# Patient Record
Sex: FEMALE | Race: WHITE | NOT HISPANIC OR LATINO
[De-identification: names, ages, dates, MRNs, and addresses within clinical notes are randomized per-mention and may not be internally consistent; named-entity substitution may affect disease eponyms.]

---

## 2018-01-04 ENCOUNTER — APPOINTMENT (OUTPATIENT)
Dept: SPINE | Facility: CLINIC | Age: 58
End: 2018-01-04

## 2018-01-26 ENCOUNTER — TRANSCRIPTION ENCOUNTER (OUTPATIENT)
Age: 58
End: 2018-01-26

## 2022-09-29 ENCOUNTER — APPOINTMENT (OUTPATIENT)
Dept: ORTHOPEDIC SURGERY | Facility: CLINIC | Age: 62
End: 2022-09-29

## 2022-09-29 VITALS — BODY MASS INDEX: 25.9 KG/M2 | HEIGHT: 67 IN | WEIGHT: 165 LBS

## 2022-09-29 DIAGNOSIS — M25.461 EFFUSION, RIGHT KNEE: ICD-10-CM

## 2022-09-29 PROCEDURE — 99204 OFFICE O/P NEW MOD 45 MIN: CPT

## 2022-09-29 PROCEDURE — 73560 X-RAY EXAM OF KNEE 1 OR 2: CPT | Mod: RT

## 2022-09-29 PROCEDURE — 73565 X-RAY EXAM OF KNEES: CPT

## 2022-09-29 NOTE — HISTORY OF PRESENT ILLNESS
[Gradual] : gradual [5] : 5 [2] : 2 [Dull/Aching] : dull/aching [Intermittent] : intermittent [Rest] : rest [Ice] : ice [Walking] : walking [Stairs] : stairs [de-identified] : r knee pain and no locking or giving way . sometimes pain as walks and changes direction. some pain for 4 to 5 knees. no locking or giving was. sharp pain at times. No falls or injuries. chronic in nature and pain down  and stairs. 63 Y/o female\par \par MRI 2018 tear lateral meniscus and some degenerative changes . also synoviits due to age arthroscopy was not recc.\par \par noted chronic disc problem lumbar that has decided against surgery.. [] : no [FreeTextEntry5] : pt has trouble lifting the right leg up. pt feel pain in the right knee. pt feels weakness in the right  knee. the right  knee swells up.  [FreeTextEntry9] : Advil  [de-identified] : 2018 [de-identified] :

## 2022-09-29 NOTE — PHYSICAL EXAM
[Right] : right knee [Left] : left knee [NL (0)] : extension 0 degrees [5___] : hamstring 5[unfilled]/5 [] : negative Pivot Shift [TWNoteComboBox7] : flexion 130 degrees

## 2022-09-29 NOTE — IMAGING
[Right] : right knee [Lateral] : lateral [[place]] : skyline [Moderate patellofemoral OA] : Moderate patellofemoral OA [Moderate tricompartmental OA lateral narrowing] : Moderate tricompartmental OA lateral narrowing [Left] : left knee [AP Standing] : anteroposterior standing [There are no fractures, subluxations or dislocations. No significant abnormalities are seen] : There are no fractures, subluxations or dislocations. No significant abnormalities are seen

## 2022-10-17 RX ORDER — HYLAN G-F 20 16MG/2ML
16 SYRINGE (ML) INTRAARTICULAR WEEKLY
Qty: 1 | Refills: 0 | Status: ACTIVE | COMMUNITY
Start: 2022-10-17 | End: 1900-01-01

## 2022-10-26 ENCOUNTER — APPOINTMENT (OUTPATIENT)
Dept: ORTHOPEDIC SURGERY | Facility: CLINIC | Age: 62
End: 2022-10-26

## 2022-11-15 ENCOUNTER — APPOINTMENT (OUTPATIENT)
Dept: ORTHOPEDIC SURGERY | Facility: CLINIC | Age: 62
End: 2022-11-15
Payer: MEDICARE

## 2022-11-15 VITALS — BODY MASS INDEX: 25.9 KG/M2 | HEIGHT: 67 IN | WEIGHT: 165 LBS

## 2022-11-15 PROCEDURE — 99213 OFFICE O/P EST LOW 20 MIN: CPT | Mod: 25

## 2022-11-15 PROCEDURE — 20610 DRAIN/INJ JOINT/BURSA W/O US: CPT

## 2022-11-15 NOTE — PROCEDURE
[FreeTextEntry3] : Viscosupplementation Injection: X-ray evidence of osteoarthritis on this or prior visit and patient has tried OTC's including aspirin, Ibuprofen, Aleve etc or prescription NSAIDS, and/or exercises at home and/ or physical therapy without satisfactory response. \par \par An injection of Synvisc 2.5ml #1 was injected into the right knee(s) after verbal consent obtained. \par \par Patient has tried OTC's including aspirin, Ibuprofen, Aleve etc or prescription NSAIDS, and/or exercises at home and/ or physical therapy without satisfactory response and Patient has decreased mobility in the joint. The risks, benefits, and alternatives to cortisone injection were explained in full to the patient. Risks outlined include but are not limited to infection, sepsis, bleeding, scarring, skin discoloration, temporary increase in pain, syncopal episode, failure to resolve symptoms, allergic reaction, and symptom recurrence. Patient understands the risks. All questions were answered. After discussion of options, patient requested an injection. Oral informed consent was obtained. Sterile technique was utilized for the procedure including the preparation of the solutions used for the injection. Injection site was prepped with betadine and alcohol. Ethyl chloride sprayed topically. Sterile technique used. Patient tolerated procedure well. \par \par Post Procedure Instructions: Patient was advised to call if redness, pain, or fever occur. Apply ice for 15 min. every 2 hours for the next 12-24 hours as tolerated. Patient was advised to rest the joint(s) for 1-2 days.\par

## 2022-11-15 NOTE — HISTORY OF PRESENT ILLNESS
[5] : 5 [Dull/Aching] : dull/aching [Intermittent] : intermittent [Walking] : walking [Stairs] : stairs [de-identified] : Follow up today. Symptoms continue. She has been approved to start Synvisc injections. [FreeTextEntry1] : right knee [FreeTextEntry5] : follow up today. Symptoms continue

## 2022-11-15 NOTE — PHYSICAL EXAM
[Right] : right knee [NL (0)] : extension 0 degrees [5___] : hamstring 5[unfilled]/5 [] : no erythema [TWNoteComboBox7] : flexion 125 degrees

## 2022-11-15 NOTE — DISCUSSION/SUMMARY
[de-identified] : The risks, benefits, and alternatives to viscosupplementation injection were reviewed with the patient. Risks outlined include but are not limited to infection, sepsis, bleeding, scarring, temporary increase in pain, syncopal episode, failure to resolve symptoms, symptoms recurrence, allergic reaction, flare reaction, pseudoseptic reaction. Patient understood the risks and asked to proceed with this treatment course.

## 2022-11-22 ENCOUNTER — APPOINTMENT (OUTPATIENT)
Dept: ORTHOPEDIC SURGERY | Facility: CLINIC | Age: 62
End: 2022-11-22
Payer: MEDICARE

## 2022-11-22 VITALS — WEIGHT: 165 LBS | HEIGHT: 67 IN | BODY MASS INDEX: 25.9 KG/M2

## 2022-11-22 PROCEDURE — 20610 DRAIN/INJ JOINT/BURSA W/O US: CPT | Mod: RT

## 2022-11-22 PROCEDURE — 99024 POSTOP FOLLOW-UP VISIT: CPT

## 2022-11-22 NOTE — PROCEDURE
[FreeTextEntry3] : Viscosupplementation Injection: X-ray evidence of osteoarthritis on this or prior visit and patient has tried OTC's including aspirin, Ibuprofen, Aleve etc or prescription NSAIDS, and/or exercises at home and/ or physical therapy without satisfactory response. \par \par An injection of Synvisc 2.5ml #2 was injected into the right knee(s) after verbal consent obtained. \par \par Patient has tried OTC's including aspirin, Ibuprofen, Aleve etc or prescription NSAIDS, and/or exercises at home and/ or physical therapy without satisfactory response and Patient has decreased mobility in the joint. The risks, benefits, and alternatives to cortisone injection were explained in full to the patient. Risks outlined include but are not limited to infection, sepsis, bleeding, scarring, skin discoloration, temporary increase in pain, syncopal episode, failure to resolve symptoms, allergic reaction, and symptom recurrence. Patient understands the risks. All questions were answered. After discussion of options, patient requested an injection. Oral informed consent was obtained. Sterile technique was utilized for the procedure including the preparation of the solutions used for the injection. Injection site was prepped with betadine and alcohol. Ethyl chloride sprayed topically. Sterile technique used. Patient tolerated procedure well. \par \par Post Procedure Instructions: Patient was advised to call if redness, pain, or fever occur. Apply ice for 15 min. every 2 hours for the next 12-24 hours as tolerated. Patient was advised to rest the joint(s) for 1-2 days.\par

## 2022-12-06 ENCOUNTER — APPOINTMENT (OUTPATIENT)
Dept: ORTHOPEDIC SURGERY | Facility: CLINIC | Age: 62
End: 2022-12-06
Payer: MEDICARE

## 2022-12-06 DIAGNOSIS — M17.11 UNILATERAL PRIMARY OSTEOARTHRITIS, RIGHT KNEE: ICD-10-CM

## 2022-12-06 PROCEDURE — 99024 POSTOP FOLLOW-UP VISIT: CPT

## 2022-12-06 PROCEDURE — 20610 DRAIN/INJ JOINT/BURSA W/O US: CPT | Mod: RT

## 2022-12-06 NOTE — PROCEDURE
[FreeTextEntry3] : Viscosupplementation Injection: X-ray evidence of osteoarthritis on this or prior visit and patient has tried OTC's including aspirin, Ibuprofen, Aleve etc or prescription NSAIDS, and/or exercises at home and/ or physical therapy without satisfactory response. \par \par An injection of Synvisc 2.5ml #3 was injected into the right knee(s) after verbal consent obtained. \par \par Patient has tried OTC's including aspirin, Ibuprofen, Aleve etc or prescription NSAIDS, and/or exercises at home and/ or physical therapy without satisfactory response and Patient has decreased mobility in the joint. The risks, benefits, and alternatives to cortisone injection were explained in full to the patient. Risks outlined include but are not limited to infection, sepsis, bleeding, scarring, skin discoloration, temporary increase in pain, syncopal episode, failure to resolve symptoms, allergic reaction, and symptom recurrence. Patient understands the risks. All questions were answered. After discussion of options, patient requested an injection. Oral informed consent was obtained. Sterile technique was utilized for the procedure including the preparation of the solutions used for the injection. Injection site was prepped with betadine and alcohol. Ethyl chloride sprayed topically. Sterile technique used. Patient tolerated procedure well. \par \par Post Procedure Instructions: Patient was advised to call if redness, pain, or fever occur. Apply ice for 15 min. every 2 hours for the next 12-24 hours as tolerated. Patient was advised to rest the joint(s) for 1-2 days.\par

## 2022-12-07 ENCOUNTER — APPOINTMENT (OUTPATIENT)
Dept: ORTHOPEDIC SURGERY | Facility: CLINIC | Age: 62
End: 2022-12-07

## 2023-06-08 ENCOUNTER — OFFICE (OUTPATIENT)
Facility: LOCATION | Age: 63
Setting detail: OPHTHALMOLOGY
End: 2023-06-08
Payer: MEDICARE

## 2023-06-08 DIAGNOSIS — H40.1131: ICD-10-CM

## 2023-06-08 DIAGNOSIS — H53.433: ICD-10-CM

## 2023-06-08 DIAGNOSIS — H53.2: ICD-10-CM

## 2023-06-08 DIAGNOSIS — H50.22: ICD-10-CM

## 2023-06-08 DIAGNOSIS — H35.033: ICD-10-CM

## 2023-06-08 DIAGNOSIS — H52.13: ICD-10-CM

## 2023-06-08 PROBLEM — H52.4 PRESBYOPIA: Status: ACTIVE | Noted: 2023-06-08

## 2023-06-08 PROCEDURE — 92015 DETERMINE REFRACTIVE STATE: CPT | Performed by: OPHTHALMOLOGY

## 2023-06-08 PROCEDURE — 92014 COMPRE OPH EXAM EST PT 1/>: CPT | Performed by: OPHTHALMOLOGY

## 2023-06-08 PROCEDURE — 92133 CPTRZD OPH DX IMG PST SGM ON: CPT | Performed by: OPHTHALMOLOGY

## 2023-06-08 PROCEDURE — 92083 EXTENDED VISUAL FIELD XM: CPT | Performed by: OPHTHALMOLOGY

## 2023-06-08 ASSESSMENT — REFRACTION_AUTOREFRACTION
OS_CYLINDER: -0.50
OD_SPHERE: -1.00
OD_CYLINDER: -0.75
OS_AXIS: 080
OS_SPHERE: -1.00
OD_AXIS: 122

## 2023-06-08 ASSESSMENT — REFRACTION_CURRENTRX
OS_AXIS: 69
OS_ADD: +2.50
OD_AXIS: 112
OD_OVR_VA: 20/
OS_AXIS: 153
OD_VPRISM_DIRECTION: BF
OS_OVR_VA: 20/
OS_ADD: +2.25
OD_VPRISM_DIRECTION: PROGS
OS_VPRISM_DIRECTION: PROGS
OS_SPHERE: -1.25
OD_ADD: +2.50
OD_CYLINDER: -0.75
OD_CYLINDER: +0.50
OS_SPHERE: -2.25
OD_SPHERE: -1.25
OS_OVR_VA: 20/
OD_AXIS: 023
OD_SPHERE: -1.75
OS_VPRISM_DIRECTION: BF
OS_CYLINDER: -0.75
OD_ADD: +2.25
OS_CYLINDER: +1.00
OD_OVR_VA: 20/

## 2023-06-08 ASSESSMENT — KERATOMETRY
OD_K1POWER_DIOPTERS: 45.75
OS_K1POWER_DIOPTERS: 45.25
OS_K2POWER_DIOPTERS: 45.00
OD_AXISANGLE_DEGREES: 045
OD_K2POWER_DIOPTERS: 46.00
OS_AXISANGLE_DEGREES: 055

## 2023-06-08 ASSESSMENT — AXIALLENGTH_DERIVED
OS_AL: 23.6771
OS_AL: 23.4825
OD_AL: 23.26
OD_AL: 23.4029

## 2023-06-08 ASSESSMENT — REFRACTION_MANIFEST
OS_CYLINDER: -1.00
OD_SPHERE: -1.25
OS_AXIS: 080
OS_VA1: 20/25
OS_SPHERE: -1.25
OD_CYLINDER: -1.00
OD_AXIS: 110
OD_VA1: 20/25

## 2023-06-08 ASSESSMENT — SPHEQUIV_DERIVED
OD_SPHEQUIV: -1.75
OS_SPHEQUIV: -1.25
OD_SPHEQUIV: -1.375
OS_SPHEQUIV: -1.75

## 2023-06-08 ASSESSMENT — VISUAL ACUITY
OS_BCVA: 20/20
OD_BCVA: 20/20-2

## 2023-06-08 ASSESSMENT — CONFRONTATIONAL VISUAL FIELD TEST (CVF)
OD_FINDINGS: FULL
OS_FINDINGS: FULL

## 2023-10-10 ENCOUNTER — APPOINTMENT (OUTPATIENT)
Dept: ORTHOPEDIC SURGERY | Facility: CLINIC | Age: 63
End: 2023-10-10
Payer: MEDICARE

## 2023-10-10 VITALS — WEIGHT: 169 LBS | HEIGHT: 67 IN | BODY MASS INDEX: 26.53 KG/M2

## 2023-10-10 PROCEDURE — 99214 OFFICE O/P EST MOD 30 MIN: CPT | Mod: 25

## 2023-10-10 PROCEDURE — 73130 X-RAY EXAM OF HAND: CPT | Mod: 50

## 2023-10-10 PROCEDURE — 20550 NJX 1 TENDON SHEATH/LIGAMENT: CPT | Mod: LT

## 2023-10-10 RX ORDER — METOPROLOL SUCCINATE 200 MG/1
TABLET, EXTENDED RELEASE ORAL
Refills: 0 | Status: ACTIVE | COMMUNITY

## 2023-10-10 RX ORDER — BUTALBITAL, ACETAMINOPHEN, AND CAFFEINE 50; 300; 40 MG/1; MG/1; MG/1
CAPSULE ORAL
Refills: 0 | Status: ACTIVE | COMMUNITY

## 2023-10-27 ENCOUNTER — APPOINTMENT (OUTPATIENT)
Age: 63
End: 2023-10-27
Payer: MEDICARE

## 2023-10-27 PROCEDURE — 26055 INCISE FINGER TENDON SHEATH: CPT | Mod: F5

## 2023-10-27 RX ORDER — ACETAMINOPHEN AND CODEINE PHOSPHATE 300; 30 MG/1; MG/1
300-30 TABLET ORAL EVERY 6 HOURS
Qty: 12 | Refills: 0 | Status: ACTIVE | COMMUNITY
Start: 2023-10-27 | End: 1900-01-01

## 2023-11-07 ENCOUNTER — APPOINTMENT (OUTPATIENT)
Dept: ORTHOPEDIC SURGERY | Facility: CLINIC | Age: 63
End: 2023-11-07
Payer: MEDICARE

## 2023-11-07 DIAGNOSIS — M18.11 UNILATERAL PRIMARY OSTEOARTHRITIS OF FIRST CARPOMETACARPAL JOINT, RIGHT HAND: ICD-10-CM

## 2023-11-07 DIAGNOSIS — M65.311 TRIGGER THUMB, RIGHT THUMB: ICD-10-CM

## 2023-11-07 DIAGNOSIS — M43.16 SPONDYLOLISTHESIS, LUMBAR REGION: ICD-10-CM

## 2023-11-07 DIAGNOSIS — M18.12 UNILATERAL PRIMARY OSTEOARTHRITIS OF FIRST CARPOMETACARPAL JOINT, LEFT HAND: ICD-10-CM

## 2023-11-07 DIAGNOSIS — M65.312 TRIGGER THUMB, LEFT THUMB: ICD-10-CM

## 2023-11-07 PROCEDURE — 99024 POSTOP FOLLOW-UP VISIT: CPT

## 2023-11-16 ENCOUNTER — OFFICE (OUTPATIENT)
Facility: LOCATION | Age: 63
Setting detail: OPHTHALMOLOGY
End: 2023-11-16
Payer: MEDICARE

## 2023-11-16 DIAGNOSIS — H46.2: ICD-10-CM

## 2023-11-16 DIAGNOSIS — H53.2: ICD-10-CM

## 2023-11-16 DIAGNOSIS — H40.1131: ICD-10-CM

## 2023-11-16 DIAGNOSIS — H53.433: ICD-10-CM

## 2023-11-16 DIAGNOSIS — H50.00: ICD-10-CM

## 2023-11-16 PROCEDURE — 92060 SENSORIMOTOR EXAMINATION: CPT | Performed by: OPHTHALMOLOGY

## 2023-11-16 PROCEDURE — 92083 EXTENDED VISUAL FIELD XM: CPT | Performed by: OPHTHALMOLOGY

## 2023-11-16 PROCEDURE — 92133 CPTRZD OPH DX IMG PST SGM ON: CPT | Performed by: OPHTHALMOLOGY

## 2023-11-16 PROCEDURE — 92012 INTRM OPH EXAM EST PATIENT: CPT | Performed by: OPHTHALMOLOGY

## 2023-11-16 ASSESSMENT — REFRACTION_CURRENTRX
OD_VPRISM_DIRECTION: PROGS
OS_CYLINDER: +1.00
OD_CYLINDER: -0.75
OD_AXIS: 112
OS_ADD: +2.25
OD_SPHERE: -1.75
OS_CYLINDER: -0.75
OD_AXIS: 023
OD_OVR_VA: 20/
OS_SPHERE: -1.25
OS_SPHERE: -2.25
OD_SPHERE: -1.25
OD_ADD: +2.50
OD_ADD: +2.25
OD_VPRISM_DIRECTION: BF
OS_VPRISM_DIRECTION: PROGS
OD_CYLINDER: +0.50
OS_AXIS: 69
OS_ADD: +2.50
OD_OVR_VA: 20/
OS_VPRISM_DIRECTION: BF
OS_OVR_VA: 20/
OS_OVR_VA: 20/
OS_AXIS: 153

## 2023-11-16 ASSESSMENT — REFRACTION_MANIFEST
OD_AXIS: 110
OD_VA1: 20/25
OD_SPHERE: -1.25
OS_VA1: 20/25
OD_CYLINDER: -1.00
OS_AXIS: 080
OS_SPHERE: -1.25
OS_CYLINDER: -1.00

## 2023-11-16 ASSESSMENT — SPHEQUIV_DERIVED
OS_SPHEQUIV: -1.75
OD_SPHEQUIV: -1.125
OS_SPHEQUIV: -1.25
OD_SPHEQUIV: -1.75

## 2023-11-16 ASSESSMENT — REFRACTION_AUTOREFRACTION
OD_SPHERE: -0.75
OD_AXIS: 116
OD_CYLINDER: -0.75
OS_AXIS: 076
OS_CYLINDER: -1.50
OS_SPHERE: -0.50

## 2023-11-16 ASSESSMENT — CONFRONTATIONAL VISUAL FIELD TEST (CVF)
OD_FINDINGS: FULL
OS_FINDINGS: FULL

## 2024-06-06 ENCOUNTER — OFFICE (OUTPATIENT)
Facility: LOCATION | Age: 64
Setting detail: OPHTHALMOLOGY
End: 2024-06-06
Payer: MEDICARE

## 2024-06-06 DIAGNOSIS — H40.1131: ICD-10-CM

## 2024-06-06 DIAGNOSIS — H53.2: ICD-10-CM

## 2024-06-06 DIAGNOSIS — H53.433: ICD-10-CM

## 2024-06-06 DIAGNOSIS — H35.033: ICD-10-CM

## 2024-06-06 DIAGNOSIS — H50.00: ICD-10-CM

## 2024-06-06 DIAGNOSIS — H46.2: ICD-10-CM

## 2024-06-06 PROCEDURE — 92060 SENSORIMOTOR EXAMINATION: CPT | Performed by: OPHTHALMOLOGY

## 2024-06-06 PROCEDURE — 92133 CPTRZD OPH DX IMG PST SGM ON: CPT | Performed by: OPHTHALMOLOGY

## 2024-06-06 PROCEDURE — 92083 EXTENDED VISUAL FIELD XM: CPT | Performed by: OPHTHALMOLOGY

## 2024-06-06 PROCEDURE — 92014 COMPRE OPH EXAM EST PT 1/>: CPT | Performed by: OPHTHALMOLOGY

## 2024-06-06 PROCEDURE — 92201 OPSCPY EXTND RTA DRAW UNI/BI: CPT | Performed by: OPHTHALMOLOGY

## 2024-06-06 ASSESSMENT — CONFRONTATIONAL VISUAL FIELD TEST (CVF)
OS_FINDINGS: FULL
OD_FINDINGS: FULL

## 2024-06-27 ENCOUNTER — APPOINTMENT (OUTPATIENT)
Dept: ORTHOPEDIC SURGERY | Facility: CLINIC | Age: 64
End: 2024-06-27
Payer: MEDICARE

## 2024-06-27 DIAGNOSIS — M65.352 TRIGGER FINGER, LEFT LITTLE FINGER: ICD-10-CM

## 2024-06-27 PROCEDURE — 20550 NJX 1 TENDON SHEATH/LIGAMENT: CPT | Mod: LT

## 2024-06-27 PROCEDURE — 99214 OFFICE O/P EST MOD 30 MIN: CPT | Mod: 25

## 2024-11-08 ENCOUNTER — APPOINTMENT (OUTPATIENT)
Dept: ORTHOPEDIC SURGERY | Facility: CLINIC | Age: 64
End: 2024-11-08
Payer: MEDICARE

## 2024-11-08 VITALS — HEIGHT: 67 IN | BODY MASS INDEX: 26.53 KG/M2 | WEIGHT: 169 LBS

## 2024-11-08 DIAGNOSIS — M17.12 UNILATERAL PRIMARY OSTEOARTHRITIS, LEFT KNEE: ICD-10-CM

## 2024-11-08 DIAGNOSIS — M23.92 UNSPECIFIED INTERNAL DERANGEMENT OF LEFT KNEE: ICD-10-CM

## 2024-11-08 DIAGNOSIS — M25.562 PAIN IN LEFT KNEE: ICD-10-CM

## 2024-11-08 PROCEDURE — 73562 X-RAY EXAM OF KNEE 3: CPT | Mod: LT

## 2024-11-08 PROCEDURE — 20610 DRAIN/INJ JOINT/BURSA W/O US: CPT | Mod: LT

## 2024-11-08 PROCEDURE — 99214 OFFICE O/P EST MOD 30 MIN: CPT | Mod: 25

## 2024-11-19 ENCOUNTER — APPOINTMENT (OUTPATIENT)
Dept: ORTHOPEDIC SURGERY | Facility: CLINIC | Age: 64
End: 2024-11-19
Payer: MEDICARE

## 2024-11-19 DIAGNOSIS — M17.12 UNILATERAL PRIMARY OSTEOARTHRITIS, LEFT KNEE: ICD-10-CM

## 2024-11-19 DIAGNOSIS — S83.282A OTHER TEAR OF LATERAL MENISCUS, CURRENT INJURY, LEFT KNEE, INITIAL ENCOUNTER: ICD-10-CM

## 2024-11-19 DIAGNOSIS — M70.50 OTHER BURSITIS OF KNEE, UNSPECIFIED KNEE: ICD-10-CM

## 2024-11-19 PROCEDURE — 99214 OFFICE O/P EST MOD 30 MIN: CPT

## 2024-11-19 RX ORDER — DICLOFENAC SODIUM 10 MG/G
1 GEL TOPICAL
Qty: 1 | Refills: 1 | Status: ACTIVE | COMMUNITY
Start: 2024-11-19 | End: 1900-01-01

## 2024-11-21 ENCOUNTER — RX ONLY (RX ONLY)
Age: 64
End: 2024-11-21

## 2024-11-21 ENCOUNTER — OFFICE (OUTPATIENT)
Facility: LOCATION | Age: 64
Setting detail: OPHTHALMOLOGY
End: 2024-11-21
Payer: MEDICARE

## 2024-11-21 DIAGNOSIS — H40.1131: ICD-10-CM

## 2024-11-21 DIAGNOSIS — H35.033: ICD-10-CM

## 2024-11-21 DIAGNOSIS — H25.13: ICD-10-CM

## 2024-11-21 DIAGNOSIS — H53.2: ICD-10-CM

## 2024-11-21 DIAGNOSIS — H53.433: ICD-10-CM

## 2024-11-21 DIAGNOSIS — H46.2: ICD-10-CM

## 2024-11-21 DIAGNOSIS — H50.00: ICD-10-CM

## 2024-11-21 PROCEDURE — 92083 EXTENDED VISUAL FIELD XM: CPT | Performed by: OPHTHALMOLOGY

## 2024-11-21 PROCEDURE — 99213 OFFICE O/P EST LOW 20 MIN: CPT | Performed by: OPHTHALMOLOGY

## 2024-11-21 PROCEDURE — 92250 FUNDUS PHOTOGRAPHY W/I&R: CPT | Performed by: OPHTHALMOLOGY

## 2024-11-21 PROCEDURE — 92060 SENSORIMOTOR EXAMINATION: CPT | Performed by: OPHTHALMOLOGY

## 2024-11-21 ASSESSMENT — REFRACTION_MANIFEST
OD_AXIS: 110
OD_VA1: 20/25
OD_SPHERE: -1.25
OS_AXIS: 080
OS_CYLINDER: -1.00
OS_SPHERE: -1.25
OS_VA1: 20/25
OD_CYLINDER: -1.00

## 2024-11-21 ASSESSMENT — KERATOMETRY
OS_K1POWER_DIOPTERS: 45.00
OS_K2POWER_DIOPTERS: 45.50
OD_K1POWER_DIOPTERS: 45.75
OS_AXISANGLE_DEGREES: 136
OD_K2POWER_DIOPTERS: 46.00
OD_AXISANGLE_DEGREES: 043

## 2024-11-21 ASSESSMENT — REFRACTION_CURRENTRX
OS_CYLINDER: -1.25
OS_OVR_VA: 20/
OD_SPHERE: -1.75
OD_AXIS: 023
OS_SPHERE: -1.50
OS_ADD: +2.50
OD_SPHERE: -1.50
OD_VPRISM_DIRECTION: PROGS
OS_VPRISM_DIRECTION: PROGS
OS_SPHERE: -1.25
OD_CYLINDER: -1.00
OS_AXIS: 080
OD_AXIS: 110
OS_VPRISM_DIRECTION: BF
OS_ADD: +2.25
OS_AXIS: 153
OS_CYLINDER: -1.00
OD_OVR_VA: 20/
OS_VPRISM_DIRECTION: PROGS
OS_SPHERE: -2.25
OD_AXIS: 108
OD_CYLINDER: -1.00
OD_VPRISM_DIRECTION: BF
OD_SPHERE: -1.25
OS_ADD: +2.50
OD_CYLINDER: +0.50
OS_AXIS: 078
OD_ADD: +2.25
OD_OVR_VA: 20/
OD_OVR_VA: 20/
OS_OVR_VA: 20/
OD_ADD: +2.50
OD_ADD: +2.50
OD_VPRISM_DIRECTION: PROGS
OS_CYLINDER: +1.00
OS_OVR_VA: 20/

## 2024-11-21 ASSESSMENT — REFRACTION_AUTOREFRACTION
OS_SPHERE: -1.00
OS_CYLINDER: -0.75
OD_AXIS: 112
OS_AXIS: 069
OD_CYLINDER: -0.75
OD_SPHERE: -1.00

## 2024-11-21 ASSESSMENT — CONFRONTATIONAL VISUAL FIELD TEST (CVF)
OS_FINDINGS: FULL
OD_FINDINGS: FULL

## 2024-11-21 ASSESSMENT — VISUAL ACUITY
OS_BCVA: 20/20-
OD_BCVA: 20/20

## 2025-06-05 ENCOUNTER — OFFICE (OUTPATIENT)
Facility: LOCATION | Age: 65
Setting detail: OPHTHALMOLOGY
End: 2025-06-05
Payer: MEDICARE

## 2025-06-05 DIAGNOSIS — H50.00: ICD-10-CM

## 2025-06-05 DIAGNOSIS — H53.433: ICD-10-CM

## 2025-06-05 DIAGNOSIS — H53.2: ICD-10-CM

## 2025-06-05 DIAGNOSIS — H46.2: ICD-10-CM

## 2025-06-05 DIAGNOSIS — H52.4: ICD-10-CM

## 2025-06-05 DIAGNOSIS — H35.033: ICD-10-CM

## 2025-06-05 PROCEDURE — 92133 CPTRZD OPH DX IMG PST SGM ON: CPT | Performed by: OPHTHALMOLOGY

## 2025-06-05 PROCEDURE — 92014 COMPRE OPH EXAM EST PT 1/>: CPT | Performed by: OPHTHALMOLOGY

## 2025-06-05 PROCEDURE — 92201 OPSCPY EXTND RTA DRAW UNI/BI: CPT | Performed by: OPHTHALMOLOGY

## 2025-06-05 PROCEDURE — 92015 DETERMINE REFRACTIVE STATE: CPT | Performed by: OPHTHALMOLOGY

## 2025-06-05 PROCEDURE — 92060 SENSORIMOTOR EXAMINATION: CPT | Performed by: OPHTHALMOLOGY

## 2025-06-05 PROCEDURE — 92083 EXTENDED VISUAL FIELD XM: CPT | Performed by: OPHTHALMOLOGY

## 2025-06-05 ASSESSMENT — REFRACTION_CURRENTRX
OS_AXIS: 078
OD_AXIS: 023
OS_AXIS: 080
OS_VPRISM_DIRECTION: PROGS
OS_ADD: +2.50
OS_SPHERE: -2.25
OS_CYLINDER: -1.00
OD_SPHERE: -1.25
OD_CYLINDER: -1.00
OS_OVR_VA: 20/
OS_VPRISM_DIRECTION: PROGS
OD_ADD: +2.50
OD_OVR_VA: 20/
OD_OVR_VA: 20/
OD_ADD: +2.50
OD_AXIS: 108
OD_VPRISM_DIRECTION: PROGS
OD_ADD: +2.50
OD_OVR_VA: 20/
OS_OVR_VA: 20/
OD_CYLINDER: -1.00
OS_SPHERE: -1.25
OD_SPHERE: -1.75
OS_SPHERE: -1.50
OS_ADD: +2.50
OS_AXIS: 153
OD_VPRISM_DIRECTION: PROGS
OS_CYLINDER: -1.25
OS_OVR_VA: 20/
OD_CYLINDER: +0.50
OD_AXIS: 110
OS_ADD: +2.50
OD_SPHERE: -1.50
OS_VPRISM_DIRECTION: PROGS
OD_VPRISM_DIRECTION: PROGS
OS_CYLINDER: +1.00

## 2025-06-05 ASSESSMENT — KERATOMETRY
OD_K1POWER_DIOPTERS: 45.75
OS_AXISANGLE_DEGREES: 158
METHOD_AUTO_MANUAL: AUTO
OD_K2POWER_DIOPTERS: 45.75
OS_K1POWER_DIOPTERS: 44.75
OD_AXISANGLE_DEGREES: 090
OS_K2POWER_DIOPTERS: 45.50

## 2025-06-05 ASSESSMENT — REFRACTION_MANIFEST
OS_VA1: 20/25
OS_AXIS: 080
OD_CYLINDER: -1.00
OD_SPHERE: -1.25
OD_ADD: +2.50
OS_AXIS: 080
OD_CYLINDER: -1.00
OS_CYLINDER: -0.75
OD_SPHERE: -1.00
OS_VA1: 20/20
OS_SPHERE: -1.25
OS_ADD: +2.50
OD_AXIS: 110
OD_VA1: 20/25
OD_VA1: 20/20
OD_AXIS: 120
OS_CYLINDER: -1.00
OS_SPHERE: -0.75

## 2025-06-05 ASSESSMENT — REFRACTION_AUTOREFRACTION
OS_SPHERE: -0.50
OS_AXIS: 074
OS_CYLINDER: -0.75
OD_SPHERE: -0.75
OD_CYLINDER: -0.75
OD_AXIS: 133

## 2025-06-05 ASSESSMENT — VISUAL ACUITY
OD_BCVA: 20/25
OS_BCVA: 20/20

## 2025-06-05 ASSESSMENT — CONFRONTATIONAL VISUAL FIELD TEST (CVF)
OD_FINDINGS: FULL
OS_FINDINGS: FULL

## 2025-08-14 ENCOUNTER — APPOINTMENT (OUTPATIENT)
Dept: ORTHOPEDIC SURGERY | Facility: CLINIC | Age: 65
End: 2025-08-14
Payer: MEDICARE

## 2025-08-14 DIAGNOSIS — M70.61 TROCHANTERIC BURSITIS, RIGHT HIP: ICD-10-CM

## 2025-08-14 DIAGNOSIS — M25.461 EFFUSION, RIGHT KNEE: ICD-10-CM

## 2025-08-14 DIAGNOSIS — M17.11 UNILATERAL PRIMARY OSTEOARTHRITIS, RIGHT KNEE: ICD-10-CM

## 2025-08-14 DIAGNOSIS — Z00.00 ENCOUNTER FOR GENERAL ADULT MEDICAL EXAMINATION W/OUT ABNORMAL FINDINGS: ICD-10-CM

## 2025-08-14 PROCEDURE — 99214 OFFICE O/P EST MOD 30 MIN: CPT

## 2025-08-14 PROCEDURE — 73562 X-RAY EXAM OF KNEE 3: CPT | Mod: RT

## 2025-08-14 PROCEDURE — 73502 X-RAY EXAM HIP UNI 2-3 VIEWS: CPT

## 2025-08-14 RX ORDER — CELECOXIB 100 MG/1
100 CAPSULE ORAL
Qty: 30 | Refills: 0 | Status: ACTIVE | COMMUNITY
Start: 2025-08-14 | End: 1900-01-01